# Patient Record
Sex: MALE | Race: BLACK OR AFRICAN AMERICAN | NOT HISPANIC OR LATINO | Employment: FULL TIME | URBAN - METROPOLITAN AREA
[De-identification: names, ages, dates, MRNs, and addresses within clinical notes are randomized per-mention and may not be internally consistent; named-entity substitution may affect disease eponyms.]

---

## 2018-06-20 ENCOUNTER — HOSPITAL ENCOUNTER (EMERGENCY)
Facility: HOSPITAL | Age: 23
Discharge: HOME | End: 2018-06-20
Attending: EMERGENCY MEDICINE

## 2018-06-20 VITALS
HEIGHT: 64 IN | BODY MASS INDEX: 22.2 KG/M2 | DIASTOLIC BLOOD PRESSURE: 64 MMHG | OXYGEN SATURATION: 99 % | WEIGHT: 130 LBS | RESPIRATION RATE: 20 BRPM | TEMPERATURE: 98 F | SYSTOLIC BLOOD PRESSURE: 122 MMHG | HEART RATE: 70 BPM

## 2018-06-20 DIAGNOSIS — R59.1 LYMPHADENOPATHY: Primary | ICD-10-CM

## 2018-06-20 DIAGNOSIS — N50.89 SCROTAL MASS: ICD-10-CM

## 2018-06-20 DIAGNOSIS — R19.09 GROIN MASS: ICD-10-CM

## 2018-06-20 PROCEDURE — 99284 EMERGENCY DEPT VISIT MOD MDM: CPT

## 2018-06-20 RX ORDER — CEPHALEXIN 500 MG/1
1000 CAPSULE ORAL EVERY 12 HOURS
Qty: 20 CAPSULE | Refills: 0 | Status: SHIPPED | OUTPATIENT
Start: 2018-06-20 | End: 2018-06-25

## 2018-06-20 NOTE — ED PROVIDER NOTES
Encounter Date: 6/20/2018    SORT: R inguinal pain x 2 days. No urinary complaints. No penile or testicular pain/swelling. No n/v. No fever. Admits to mass to inguinal area. No hx hernia rx.  SCRIBE #1 NOTE: I, Sasmon Jones, am scribing for, and in the presence of,  Dallas Zurita MD. I have scribed the following portions of the note - Other sections scribed: HPI and ROS.       History     Chief Complaint   Patient presents with    Groin Pain     right side     Chief Complaint: Groin Pain    HPI: This 22 y.o. Male wit no known PMHx presents to the ED c/o acute onset right side groin pain. Symptoms began yesterday. Patient suspects possible hernia. Pain is constant, severe, and has worsened since onset. Patient attempted treatment with Aspirin. He does endorse a lot of heavy lifting at work. He denies numbness, weakness, nausea, vomiting, or abdominal pain.      The history is provided by the patient. No  was used.     Review of patient's allergies indicates:   Allergen Reactions    Bananas [banana] Rash     No past medical history on file.  No past surgical history on file.  No family history on file.  Social History   Substance Use Topics    Smoking status: Not on file    Smokeless tobacco: Not on file    Alcohol use Not on file     Review of Systems   Constitutional: Negative for chills and fever.   HENT: Negative for ear pain and sore throat.    Eyes: Negative for pain.   Respiratory: Negative for cough and shortness of breath.    Cardiovascular: Negative for chest pain.   Gastrointestinal: Negative for abdominal pain, diarrhea, nausea and vomiting.   Genitourinary: Negative for dysuria.        Groin pain   Musculoskeletal: Negative for myalgias (arm or leg pain).   Skin: Negative for rash.   Neurological: Negative for weakness, numbness and headaches.       Physical Exam     Initial Vitals [06/20/18 1552]   BP Pulse Resp Temp SpO2   122/64 70 20 98.3 °F (36.8 °C) 99 %      MAP       --          Physical Exam    Nursing note and vitals reviewed.  Constitutional: He appears well-developed and well-nourished.   HENT:   Head: Normocephalic and atraumatic.   Eyes: EOM are normal.   Neck: Neck supple.   Cardiovascular: Normal rate.   Pulmonary/Chest: Effort normal and breath sounds normal. No respiratory distress.   Genitourinary:   Genitourinary Comments: Area of induration and tenderness to R groin in the area of tunica vaginalis.      Mass is moveable    Didn't feel anything go through canal when having patient cough   Musculoskeletal: Normal range of motion.   Neurological: He is alert and oriented to person, place, and time.   Skin: Skin is warm and dry.   Psychiatric: He has a normal mood and affect.         ED Course   Procedures  Labs Reviewed - No data to display       Imaging Results    None                     Scribe Attestation:   Scribe #1: I performed the above scribed service and the documentation accurately describes the services I performed. I attest to the accuracy of the note.    Attending Attestation:           Physician Attestation for Scribe:  Physician Attestation Statement for Scribe #1: I, Dallas Zurita MD, reviewed documentation, as scribed by Samson Jones in my presence, and it is both accurate and complete.                    Clinical Impression:   There were no encounter diagnoses.                             Dallas Zurita MD  11/13/18 1575

## 2022-06-01 ENCOUNTER — HOSPITAL ENCOUNTER (EMERGENCY)
Facility: HOSPITAL | Age: 27
Discharge: HOME | End: 2022-06-01
Attending: EMERGENCY MEDICINE

## 2022-06-01 VITALS
OXYGEN SATURATION: 99 % | RESPIRATION RATE: 16 BRPM | HEART RATE: 55 BPM | SYSTOLIC BLOOD PRESSURE: 109 MMHG | TEMPERATURE: 98 F | DIASTOLIC BLOOD PRESSURE: 75 MMHG | WEIGHT: 120 LBS | HEIGHT: 67 IN | BODY MASS INDEX: 18.83 KG/M2

## 2022-06-01 DIAGNOSIS — M54.50 ACUTE BILATERAL LOW BACK PAIN WITHOUT SCIATICA: Primary | ICD-10-CM

## 2022-06-01 PROCEDURE — 99284 EMERGENCY DEPT VISIT MOD MDM: CPT | Mod: 25

## 2022-06-01 PROCEDURE — 96372 THER/PROPH/DIAG INJ SC/IM: CPT | Performed by: NURSE PRACTITIONER

## 2022-06-01 PROCEDURE — 63600175 PHARM REV CODE 636 W HCPCS: Performed by: NURSE PRACTITIONER

## 2022-06-01 PROCEDURE — 25000003 PHARM REV CODE 250: Performed by: NURSE PRACTITIONER

## 2022-06-01 RX ORDER — KETOROLAC TROMETHAMINE 10 MG/1
10 TABLET, FILM COATED ORAL EVERY 6 HOURS PRN
Qty: 20 TABLET | Refills: 0 | Status: SHIPPED | OUTPATIENT
Start: 2022-06-01 | End: 2022-06-06

## 2022-06-01 RX ORDER — IBUPROFEN 200 MG
800 TABLET ORAL EVERY 6 HOURS PRN
COMMUNITY

## 2022-06-01 RX ORDER — ACETAMINOPHEN 500 MG
1000 TABLET ORAL
Status: COMPLETED | OUTPATIENT
Start: 2022-06-01 | End: 2022-06-01

## 2022-06-01 RX ORDER — LIDOCAINE 50 MG/G
1 PATCH TOPICAL DAILY
Qty: 15 PATCH | Refills: 0 | Status: SHIPPED | OUTPATIENT
Start: 2022-06-01

## 2022-06-01 RX ORDER — DEXAMETHASONE SODIUM PHOSPHATE 4 MG/ML
8 INJECTION, SOLUTION INTRA-ARTICULAR; INTRALESIONAL; INTRAMUSCULAR; INTRAVENOUS; SOFT TISSUE
Status: COMPLETED | OUTPATIENT
Start: 2022-06-01 | End: 2022-06-01

## 2022-06-01 RX ORDER — ORPHENADRINE CITRATE 100 MG/1
100 TABLET, EXTENDED RELEASE ORAL 2 TIMES DAILY PRN
Qty: 20 TABLET | Refills: 0 | Status: SHIPPED | OUTPATIENT
Start: 2022-06-01 | End: 2022-06-11

## 2022-06-01 RX ADMIN — DEXAMETHASONE SODIUM PHOSPHATE 8 MG: 4 INJECTION INTRA-ARTICULAR; INTRALESIONAL; INTRAMUSCULAR; INTRAVENOUS; SOFT TISSUE at 11:06

## 2022-06-01 RX ADMIN — ACETAMINOPHEN 1000 MG: 500 TABLET ORAL at 11:06

## 2022-06-01 NOTE — Clinical Note
"Mayco London (Shawn) was seen and treated in our emergency department on 6/1/2022.  He may return to work on 06/06/2022.       If you have any questions or concerns, please don't hesitate to call.       RN    "

## 2022-06-01 NOTE — DISCHARGE INSTRUCTIONS
You have been prescribed NORFLEX for pain. Please do not take this medication while working, drinking alcohol, swimming, or while driving/operating heavy machinery. This medication may cause drowsiness, impair judgment, and reduce physical capabilities.    You have been prescribed TORADOL for pain. This is an Non-Steroidal Anti-Inflammatory (NSAID) Medication. Please do not take any additional NSAIDs while you are taking this medication including (Advil, Aleve, Motrin, Ibuprofen, Mobic\meloxicam, Naprosyn, etc.). Please stop taking this medication if you experience: weakness, itching, yellow skin or eyes, joint pains, vomiting blood, blood or black stools, unusual weight gain, or swelling in your arms, legs, hands, or feet.     Please return to the Emergency Department for any new or worsening symptoms including:  Abdominal pain, numbness or tingling to your groin, difficulty walking, going to the bathroom on yourself, problems going to the bathroom, fever, chest pain, shortness of breath, loss of consciousness, dizziness, weakness, or any other concerns.     Please follow up with your Primary Care Provider within the week. If you do not have one, you may contact the one listed on your discharge paperwork or you may also call the [facility] Appointment Desk at [phone] to schedule an appointment with one.     Please take all medication as prescribed.

## 2022-06-01 NOTE — ED TRIAGE NOTES
"Patient reports pain to lower back area and describes it as "stabbing". Patient states that he has been in pain for the past week. Patient also reports n/v and hasn't been able to keep anything down for the past week but isn't experiencing G. I. Discomfort currently.   "

## 2022-06-01 NOTE — ED PROVIDER NOTES
Encounter Date: 6/1/2022       History     Chief Complaint   Patient presents with    Back Pain     The patient reports lower bilateral back pain that started over 2 weeks ago. Denies falls, trauma. Reports that he does heavy lifting at work. Denies pain radiating. Patient states that he took ibuprofen this morning with some pain relief.     27 y/o male with hypoglycemia presents to the ED c/o x1 wk hx of acute onset atraumatic lumbar back pain (L>R) that worsened x2 days ago. He reports an 8/10 pain that is worse with movement. He reports heavy lifting at work. He attempted 800 mg ibuprofen at 9AM today with mild relief. He denies fever, chills, saddle anesthesia, bladder/bowel incontinence, hematuria, dysuria, or abdominal pain. No hx of IVDU, cancer, or DMII. No other symptoms reported.    The history is provided by the patient. No  was used.     Review of patient's allergies indicates:   Allergen Reactions    Bananas [banana] Rash     History reviewed. No pertinent past medical history.  History reviewed. No pertinent surgical history.  History reviewed. No pertinent family history.  Social History     Tobacco Use    Smoking status: Current Every Day Smoker     Packs/day: 0.50     Types: Cigarettes    Smokeless tobacco: Never Used   Substance Use Topics    Alcohol use: No    Drug use: Yes     Types: Marijuana     Comment: daily     Review of Systems   Constitutional: Negative for fever.   HENT: Negative for sore throat.    Eyes: Negative for redness.   Respiratory: Negative for shortness of breath.    Cardiovascular: Negative for chest pain.   Gastrointestinal: Negative for nausea.   Genitourinary: Negative for dysuria.   Musculoskeletal: Positive for back pain (lumbar).   Skin: Negative for rash.   Neurological: Negative for weakness.   Hematological: Does not bruise/bleed easily.       Physical Exam     Initial Vitals [06/01/22 1005]   BP Pulse Resp Temp SpO2   116/71 (!) 58 16 98.5  °F (36.9 °C) 97 %      MAP       --         Physical Exam    Vitals reviewed.  Constitutional: He appears well-developed and well-nourished. He is not diaphoretic.  Non-toxic appearance. He does not have a sickly appearance. He does not appear ill. No distress.   HENT:   Head: Normocephalic and atraumatic.   Right Ear: External ear normal.   Left Ear: External ear normal.   Neck:   Normal range of motion.  Cardiovascular: Normal rate, regular rhythm, normal heart sounds and intact distal pulses.   Pulmonary/Chest: Breath sounds normal. No respiratory distress.   Abdominal: Abdomen is soft. Bowel sounds are normal. There is no abdominal tenderness.   Musculoskeletal:         General: Normal range of motion.      Cervical back: Normal and normal range of motion.      Thoracic back: Normal.      Lumbar back: Tenderness (b/l paraspinal) present. No swelling, edema, deformity, signs of trauma, spasms or bony tenderness.      Comments: No step offs. Sensation and strength intact for b/l LE.     Neurological: He is alert and oriented to person, place, and time. GCS eye subscore is 4. GCS verbal subscore is 5. GCS motor subscore is 6.   Skin: Skin is warm, dry and intact. No rash noted. No erythema.   Psychiatric: He has a normal mood and affect. Thought content normal.         ED Course   Procedures  Labs Reviewed - No data to display       Imaging Results    None          Medications   dexamethasone injection 8 mg (8 mg Intramuscular Given 6/1/22 1108)   acetaminophen tablet 1,000 mg (1,000 mg Oral Given 6/1/22 1108)     Medical Decision Making:   ED Management:    This is an evaluation of a 26 y.o. male that presents to the Emergency Department for back pain.  Denies fever, rash, night sweats, weight loss, dysuria, bowel/bladder incontinence, or IV\SQ drug use. The patient is a non-toxic, afebrile, and well appearing male. On physical exam, there is tenderness with palpation of the paraspinal lumbar musculature. Thereis  no abdominal pain to palpation, CVA tenderness, saddle anesthesia. Reflexes and sensation are symmetric bilaterally. DP pulses are symmetrical.     Vital signs reassuring.    No red flags on exam or history.    Given the above findings, I do not think the patient has acute vertebral fracture, subluxation, dislocation, epidural abscess, cauda equina, shingles, UTI.    The diagnosis, treatment plan, instructions for follow-up and reevaluation with PCP as well as ED return precautions were discussed and understanding was verbalized. All questions or concerns have been addressed.                         Clinical Impression:   Final diagnoses:  [M54.50] Acute bilateral low back pain without sciatica (Primary)          ED Disposition Condition    Discharge Stable        ED Prescriptions     Medication Sig Dispense Start Date End Date Auth. Provider    orphenadrine (NORFLEX) 100 mg tablet Take 1 tablet (100 mg total) by mouth 2 (two) times daily as needed for Muscle spasms or Pain. 20 tablet 6/1/2022 6/11/2022 Loraine Contreras NP    ketorolac (TORADOL) 10 mg tablet Take 1 tablet (10 mg total) by mouth every 6 (six) hours as needed for Pain. 20 tablet 6/1/2022 6/6/2022 Loraine Contreras NP    LIDOcaine (LIDODERM) 5 % Place 1 patch onto the skin once daily. Remove & Discard patch within 12 hours or as directed by MD 15 patch 6/1/2022  Loraine Contreras NP        Follow-up Information     Follow up With Specialties Details Why Contact Info Additional Information    [facility] [place]  Schedule an appointment as soon as possible for a visit in 1 day For follow-up if you do not have a primary care doctor [address] [place] [address]  [phone]       [facility] Schedule an appointment as soon as possible for a visit  For follow-up [address], [address]  [facility] [address] [phone] Back [facility] - [facility], 4th Floor Please park in [address] and use [place] elevators    [facility] Emergency Dept Emergency Medicine Go to  If symptoms worsen [address]  [facility] [address] [phone]            Loraine Contreras NP  06/01/22 1303

## 2024-12-02 ENCOUNTER — HOSPITAL ENCOUNTER (EMERGENCY)
Facility: HOSPITAL | Age: 29
Discharge: HOME | End: 2024-12-02
Attending: EMERGENCY MEDICINE
Payer: COMMERCIAL

## 2024-12-02 VITALS
WEIGHT: 130 LBS | OXYGEN SATURATION: 96 % | HEIGHT: 65 IN | SYSTOLIC BLOOD PRESSURE: 122 MMHG | DIASTOLIC BLOOD PRESSURE: 77 MMHG | RESPIRATION RATE: 16 BRPM | BODY MASS INDEX: 21.66 KG/M2 | HEART RATE: 86 BPM | TEMPERATURE: 99 F

## 2024-12-02 DIAGNOSIS — M79.644 THUMB PAIN, RIGHT: Primary | ICD-10-CM

## 2024-12-02 PROCEDURE — 99283 EMERGENCY DEPT VISIT LOW MDM: CPT | Mod: 25

## 2024-12-02 PROCEDURE — 29125 APPL SHORT ARM SPLINT STATIC: CPT | Mod: RT

## 2024-12-02 RX ORDER — NAPROXEN 500 MG/1
500 TABLET ORAL 2 TIMES DAILY WITH MEALS
Qty: 14 TABLET | Refills: 0 | Status: SHIPPED | OUTPATIENT
Start: 2024-12-02

## 2024-12-02 NOTE — ED PROVIDER NOTES
Encounter Date: 12/2/2024       History     Chief Complaint   Patient presents with    Hand Pain     Pt reports R thumb (thenar space) pain x 1 week after using a punching bag while exercising.      Chief complaint: Right thumb pain     History of present illness: Patient is a 29-year-old male who reports for 1 week he has had right thumb pain without known injury.  The pain is at the base of the thumb it is constant aching and shooting.  It is worse with bending.  There is no alleviating factors.  Current severity pain is 7/10.  He has used icy hot and wraps without relief.    The history is provided by the patient. No  was used.     Review of patient's allergies indicates:   Allergen Reactions    Bananas [banana] Rash     History reviewed. No pertinent past medical history.  History reviewed. No pertinent surgical history.  No family history on file.  Social History     Tobacco Use    Smoking status: Every Day     Current packs/day: 0.50     Types: Cigarettes    Smokeless tobacco: Never   Substance Use Topics    Alcohol use: No    Drug use: Yes     Types: Marijuana     Comment: daily     Review of Systems   Constitutional:  Negative for appetite change, chills, diaphoresis, fatigue and fever.   HENT:  Negative for congestion, ear discharge, ear pain, postnasal drip, rhinorrhea, sinus pressure, sneezing, sore throat and voice change.    Eyes:  Negative for discharge, itching and visual disturbance.   Respiratory:  Negative for cough, shortness of breath and wheezing.    Cardiovascular:  Negative for chest pain, palpitations and leg swelling.   Gastrointestinal:  Negative for abdominal pain, nausea and vomiting.   Endocrine: Negative for polydipsia, polyphagia and polyuria.   Genitourinary:  Negative for difficulty urinating, dysuria, frequency, hematuria, penile discharge, penile pain, penile swelling and urgency.   Musculoskeletal:  Positive for arthralgias. Negative for myalgias.   Skin:   Negative for rash and wound.   Neurological:  Negative for dizziness, seizures, syncope and weakness.   Hematological:  Negative for adenopathy. Does not bruise/bleed easily.   Psychiatric/Behavioral:  Negative for agitation and self-injury. The patient is not nervous/anxious.        Physical Exam     Initial Vitals [12/02/24 1047]   BP Pulse Resp Temp SpO2   122/77 86 16 98.6 °F (37 °C) 96 %      MAP       --         Physical Exam    Nursing note and vitals reviewed.  Constitutional: He appears well-developed and well-nourished. He is not diaphoretic. No distress.   HENT:   Head: Normocephalic and atraumatic.   Right Ear: External ear normal.   Left Ear: External ear normal.   Nose: Nose normal.   Eyes: Pupils are equal, round, and reactive to light. Right eye exhibits no discharge. Left eye exhibits no discharge. No scleral icterus.   Neck:   Normal range of motion.  Pulmonary/Chest: No respiratory distress.   Abdominal: He exhibits no distension.   Musculoskeletal:         General: Normal range of motion.      Cervical back: Normal range of motion.      Comments: Right thumb with full range of motion in all dimensions of movement against resistance.  Distal P SM is intact.  There is snuffbox tenderness.     Neurological: He is alert and oriented to person, place, and time.   Skin: Skin is dry. Capillary refill takes less than 2 seconds.         ED Course   Procedures  Labs Reviewed - No data to display       Imaging Results              X-Ray Hand 3 View Right (Final result)  Result time 12/02/24 12:28:04      Final result by Dell Benjamin MD (12/02/24 12:28:04)                   Impression:      Suspected mild nonspecific soft tissue swelling overlying the 3rd metacarpal head, without acute displaced fracture-dislocation identified.      Electronically signed by: Dell Benjamin MD  Date:    12/02/2024  Time:    12:28               Narrative:    EXAMINATION:  XR HAND COMPLETE 3 VIEW RIGHT    CLINICAL  "HISTORY:  trauma;    TECHNIQUE:  PA, lateral, and oblique views of the right hand were performed.    COMPARISON:  None    FINDINGS:  Bones are well mineralized. Overall alignment is within normal limits. No displaced fracture, dislocation or destructive osseous process.  Joint spaces appear relatively maintained.  Mild prominence of the soft tissues overlying the dorsal aspect of the 3rd metacarpal head may reflect nonspecific soft tissue swelling from edema or cellulitis.  No subcutaneous emphysema or radiopaque foreign body.                                       Medications - No data to display  Medical Decision Making  Patient is a 29-year-old male who reports for 1 week he has had right thumb pain without known injury.  The pain is at the base of the thumb it is constant aching and shooting.  It is worse with bending.  There is no alleviating factors.  Current severity pain is 7/10.  He has used icy hot and wraps without relief.    Right thumb with full range of motion in all dimensions of movement against resistance.  Distal P SM is intact.  There is snuffbox tenderness.     Differential diagnosis includes fracture dislocation sprain strain.    Problems Addressed:  Thumb pain, right: acute illness or injury     Details: Velcro thumb spica splint applied.  Patient discharged on Naprosyn to follow up with hand surgery.    Amount and/or Complexity of Data Reviewed  Discussion of management or test interpretation with external provider(s): Vital signs at the time of disposition were:  /77 (BP Location: Right arm)   Pulse 86   Temp 98.6 °F (37 °C) (Oral)   Resp 16   Ht 5' 5" (1.651 m)   Wt 59 kg (130 lb)   SpO2 96%   BMI 21.63 kg/m²       See AVS for additional recommendations. Medications listed herein were prescribed after reviewing the patient's allergies, medication list, history, most recent laboratories as available.  Referrals below were provided after reviewing the patient's previous medical " providers. He understands he  should return for any worsening or changes in condition.  Prior to discharge the patient was asked if he  had any additional concerns or complaints and he declined. The patient was given an opportunity to ask questions and all were answered to his satisfaction.     Risk  Prescription drug management.  Diagnosis or treatment significantly limited by social determinants of health.               ED Course as of 12/02/24 1343   Mon Dec 02, 2024   1118 BP: 122/77 [VC]   1118 Temp: 98.6 °F (37 °C) [VC]   1118 Temp Source: Oral [VC]   1118 Pulse: 86 [VC]   1118 SpO2: 96 % [VC]   1118 Resp: 16 []      ED Course User Index  [] Jorge Sharma DNP                           Clinical Impression:  Final diagnoses:  [M79.644] Thumb pain, right (Primary)          ED Disposition Condition    Discharge           ED Prescriptions       Medication Sig Dispense Start Date End Date Auth. Provider    naproxen (NAPROSYN) 500 MG tablet Take 1 tablet (500 mg total) by mouth 2 (two) times daily with meals. 14 tablet 12/2/2024 -- Jorge Sharma DNP          Follow-up Information       Follow up With Specialties Details Why Contact Info    Ugo Briseno Jr., MD Hand Surgery, Orthopedic Surgery Schedule an appointment as soon as possible for a visit   [address]  [address]  [facility] [id number] [phone]               Jorge Sharma DNP  12/02/24 2143

## 2024-12-02 NOTE — DISCHARGE INSTRUCTIONS
You have been prescribed naprosyn (naproxen sodium), an anti-inflammatory.  Take this medication whether you feel you need it or not.  Do not take ibuprofen, naproxen or other NSAID's medications while taking this medication. Return to the Emergency Department for any worsening, change in condition, or any emergent concerns.

## 2025-04-10 ENCOUNTER — HOSPITAL ENCOUNTER (EMERGENCY)
Facility: HOSPITAL | Age: 30
Discharge: HOME | End: 2025-04-10
Attending: STUDENT IN AN ORGANIZED HEALTH CARE EDUCATION/TRAINING PROGRAM
Payer: OTHER MISCELLANEOUS

## 2025-04-10 VITALS
SYSTOLIC BLOOD PRESSURE: 113 MMHG | RESPIRATION RATE: 18 BRPM | BODY MASS INDEX: 21.66 KG/M2 | HEIGHT: 65 IN | DIASTOLIC BLOOD PRESSURE: 68 MMHG | TEMPERATURE: 98 F | HEART RATE: 66 BPM | OXYGEN SATURATION: 94 % | WEIGHT: 130 LBS

## 2025-04-10 DIAGNOSIS — R55 SYNCOPE: ICD-10-CM

## 2025-04-10 DIAGNOSIS — E86.0 DEHYDRATION: Primary | ICD-10-CM

## 2025-04-10 DIAGNOSIS — B34.9 VIRAL SYNDROME: ICD-10-CM

## 2025-04-10 LAB
ABSOLUTE EOSINOPHIL (OHS): 0.02 K/UL
ABSOLUTE MONOCYTE (OHS): 1.5 K/UL (ref 0.3–1)
ABSOLUTE NEUTROPHIL COUNT (OHS): 12.69 K/UL (ref 1.8–7.7)
ALBUMIN SERPL BCP-MCNC: 4.2 G/DL (ref 3.5–5.2)
ALP SERPL-CCNC: 79 UNIT/L (ref 40–150)
ALT SERPL W/O P-5'-P-CCNC: 25 UNIT/L (ref 10–44)
ANION GAP (OHS): 11 MMOL/L (ref 8–16)
AST SERPL-CCNC: 46 UNIT/L (ref 11–45)
BASOPHILS # BLD AUTO: 0.07 K/UL
BASOPHILS NFR BLD AUTO: 0.4 %
BILIRUB SERPL-MCNC: 0.5 MG/DL (ref 0.1–1)
BUN SERPL-MCNC: 13 MG/DL (ref 6–20)
CALCIUM SERPL-MCNC: 9.3 MG/DL (ref 8.7–10.5)
CHLORIDE SERPL-SCNC: 102 MMOL/L (ref 95–110)
CO2 SERPL-SCNC: 23 MMOL/L (ref 23–29)
CREAT SERPL-MCNC: 0.9 MG/DL (ref 0.5–1.4)
CTP QC/QA: YES
CTP QC/QA: YES
ERYTHROCYTE [DISTWIDTH] IN BLOOD BY AUTOMATED COUNT: 13.2 % (ref 11.5–14.5)
GFR SERPLBLD CREATININE-BSD FMLA CKD-EPI: >60 ML/MIN/1.73/M2
GLUCOSE SERPL-MCNC: 106 MG/DL (ref 70–110)
GLUCOSE SERPL-MCNC: 94 MG/DL (ref 70–110)
HCT VFR BLD AUTO: 43.5 % (ref 40–54)
HGB BLD-MCNC: 14.9 GM/DL (ref 14–18)
IMM GRANULOCYTES # BLD AUTO: 0.06 K/UL (ref 0–0.04)
IMM GRANULOCYTES NFR BLD AUTO: 0.4 % (ref 0–0.5)
LYMPHOCYTES # BLD AUTO: 1.25 K/UL (ref 1–4.8)
MAGNESIUM SERPL-MCNC: 2.4 MG/DL (ref 1.6–2.6)
MCH RBC QN AUTO: 29.5 PG (ref 27–31)
MCHC RBC AUTO-ENTMCNC: 34.3 G/DL (ref 32–36)
MCV RBC AUTO: 86 FL (ref 82–98)
NUCLEATED RBC (/100WBC) (OHS): 0 /100 WBC
PLATELET # BLD AUTO: 188 K/UL (ref 150–450)
PMV BLD AUTO: 9.7 FL (ref 9.2–12.9)
POC MOLECULAR INFLUENZA A AGN: NEGATIVE
POC MOLECULAR INFLUENZA B AGN: NEGATIVE
POCT GLUCOSE: 106 MG/DL (ref 70–110)
POTASSIUM SERPL-SCNC: 3.7 MMOL/L (ref 3.5–5.1)
PROT SERPL-MCNC: 8.6 GM/DL (ref 6–8.4)
RBC # BLD AUTO: 5.05 M/UL (ref 4.6–6.2)
RELATIVE EOSINOPHIL (OHS): 0.1 %
RELATIVE LYMPHOCYTE (OHS): 8 % (ref 18–48)
RELATIVE MONOCYTE (OHS): 9.6 % (ref 4–15)
RELATIVE NEUTROPHIL (OHS): 81.5 % (ref 38–73)
SARS-COV-2 RDRP RESP QL NAA+PROBE: NEGATIVE
SODIUM SERPL-SCNC: 136 MMOL/L (ref 136–145)
WBC # BLD AUTO: 15.59 K/UL (ref 3.9–12.7)

## 2025-04-10 PROCEDURE — 93005 ELECTROCARDIOGRAM TRACING: CPT

## 2025-04-10 PROCEDURE — 63600175 PHARM REV CODE 636 W HCPCS: Mod: JZ,TB | Performed by: STUDENT IN AN ORGANIZED HEALTH CARE EDUCATION/TRAINING PROGRAM

## 2025-04-10 PROCEDURE — 96374 THER/PROPH/DIAG INJ IV PUSH: CPT

## 2025-04-10 PROCEDURE — 82962 GLUCOSE BLOOD TEST: CPT

## 2025-04-10 PROCEDURE — 96361 HYDRATE IV INFUSION ADD-ON: CPT

## 2025-04-10 PROCEDURE — 85025 COMPLETE CBC W/AUTO DIFF WBC: CPT | Performed by: STUDENT IN AN ORGANIZED HEALTH CARE EDUCATION/TRAINING PROGRAM

## 2025-04-10 PROCEDURE — 93010 ELECTROCARDIOGRAM REPORT: CPT | Mod: ,,, | Performed by: INTERNAL MEDICINE

## 2025-04-10 PROCEDURE — 83735 ASSAY OF MAGNESIUM: CPT | Performed by: STUDENT IN AN ORGANIZED HEALTH CARE EDUCATION/TRAINING PROGRAM

## 2025-04-10 PROCEDURE — 99284 EMERGENCY DEPT VISIT MOD MDM: CPT | Mod: 25

## 2025-04-10 PROCEDURE — 82040 ASSAY OF SERUM ALBUMIN: CPT | Performed by: STUDENT IN AN ORGANIZED HEALTH CARE EDUCATION/TRAINING PROGRAM

## 2025-04-10 PROCEDURE — 87502 INFLUENZA DNA AMP PROBE: CPT

## 2025-04-10 PROCEDURE — 25000003 PHARM REV CODE 250: Performed by: STUDENT IN AN ORGANIZED HEALTH CARE EDUCATION/TRAINING PROGRAM

## 2025-04-10 PROCEDURE — 87635 SARS-COV-2 COVID-19 AMP PRB: CPT | Performed by: STUDENT IN AN ORGANIZED HEALTH CARE EDUCATION/TRAINING PROGRAM

## 2025-04-10 RX ORDER — ACETAMINOPHEN 500 MG
1000 TABLET ORAL
Status: COMPLETED | OUTPATIENT
Start: 2025-04-10 | End: 2025-04-10

## 2025-04-10 RX ORDER — KETOROLAC TROMETHAMINE 30 MG/ML
15 INJECTION, SOLUTION INTRAMUSCULAR; INTRAVENOUS
Status: COMPLETED | OUTPATIENT
Start: 2025-04-10 | End: 2025-04-10

## 2025-04-10 RX ADMIN — ACETAMINOPHEN 1000 MG: 500 TABLET ORAL at 10:04

## 2025-04-10 RX ADMIN — SODIUM CHLORIDE, POTASSIUM CHLORIDE, SODIUM LACTATE AND CALCIUM CHLORIDE 1000 ML: 600; 310; 30; 20 INJECTION, SOLUTION INTRAVENOUS at 10:04

## 2025-04-10 RX ADMIN — KETOROLAC TROMETHAMINE 15 MG: 30 INJECTION, SOLUTION INTRAMUSCULAR; INTRAVENOUS at 10:04

## 2025-04-10 NOTE — ED NOTES
Ortho vitals completed, pt had no significant vital change in blood pressure, only change was Heart rate. Pt laying heart rate was 82 and had jumped to 115 when standing.

## 2025-04-10 NOTE — ED PROVIDER NOTES
Encounter Date: 4/10/2025       History     Chief Complaint   Patient presents with    Loss of Consciousness     Pt arrived via ems, pt chief complaint is Syncope. Pt had a syncopal episode while at work, pt states had been feeling unwell for past few days with flu like symptoms.       Patient is a 29-year-old with no past medical history presents after a syncopal episode at work.  Patient reports multiple days of congestion, sore throat, headaches.  He had 1 episode of subjective fever but has not had any measured fevers.  He denies nausea vomiting diarrhea.  At work today, cutting grass, he began to feel lightheaded.  Patient says he knew he was going to pass out and was able to slowly lower himself to the ground onto his elbow.  He denies head trauma.  Endorses continued throat pain and mild headache.  He has not taken any medications for his symptoms.  He denies chest pain, shortness of breath, current lightheadedness or dizziness, weakness.    The history is provided by the patient. No  was used.     Review of patient's allergies indicates:   Allergen Reactions    Bananas [banana] Rash     History reviewed. No pertinent past medical history.  History reviewed. No pertinent surgical history.  No family history on file.  Social History[1]  Review of Systems   Constitutional:  Negative for fever.   HENT:  Negative for sore throat.    Respiratory:  Negative for shortness of breath.    Cardiovascular:  Negative for chest pain.   Gastrointestinal:  Negative for nausea.   Genitourinary:  Negative for dysuria.   Musculoskeletal:  Negative for back pain.   Skin:  Negative for rash.   Neurological:  Negative for weakness.       Physical Exam     Initial Vitals [04/10/25 0926]   BP Pulse Resp Temp SpO2   98/60 80 18 98.4 °F (36.9 °C) 98 %      MAP       --         Physical Exam    Constitutional: Vital signs are normal. No distress.   HENT:   Head: Normocephalic and atraumatic.   Eyes: EOM are normal.    Neck:   Normal range of motion.  Cardiovascular:  Normal rate, regular rhythm and intact distal pulses.           Pulmonary/Chest: Breath sounds normal. No respiratory distress.   Abdominal: Abdomen is soft. There is no abdominal tenderness.   Musculoskeletal:         General: No edema. Normal range of motion.      Cervical back: Normal range of motion.     Neurological: He is alert and oriented to person, place, and time. GCS score is 15. GCS eye subscore is 4. GCS verbal subscore is 5. GCS motor subscore is 6.   Skin: Skin is warm and dry.         ED Course   Procedures  Labs Reviewed   COMPREHENSIVE METABOLIC PANEL - Abnormal       Result Value    Sodium 136      Potassium 3.7      Chloride 102      CO2 23      Glucose 94      BUN 13      Creatinine 0.9      Calcium 9.3      Protein Total 8.6 (*)     Albumin 4.2      Bilirubin Total 0.5      ALP 79      AST 46 (*)     ALT 25      Anion Gap 11      eGFR >60     CBC WITH DIFFERENTIAL - Abnormal    WBC 15.59 (*)     RBC 5.05      HGB 14.9      HCT 43.5      MCV 86      MCH 29.5      MCHC 34.3      RDW 13.2      Platelet Count 188      MPV 9.7      Nucleated RBC 0      Neut % 81.5 (*)     Lymph % 8.0 (*)     Mono % 9.6      Eos % 0.1      Basophil % 0.4      Imm Grans % 0.4      Neut # 12.69 (*)     Lymph # 1.25      Mono # 1.50 (*)     Eos # 0.02      Baso # 0.07      Imm Grans # 0.06 (*)    MAGNESIUM - Normal    Magnesium  2.4     CBC W/ AUTO DIFFERENTIAL    Narrative:     The following orders were created for panel order CBC auto differential.  Procedure                               Abnormality         Status                     ---------                               -----------         ------                     CBC with Differential[[id number]]          Abnormal            Final result                 Please view results for these tests on the individual orders.   SARS-COV-2 RDRP GENE    POC Rapid COVID Negative       Acceptable Yes     POCT  INFLUENZA A/B MOLECULAR    POC Molecular Influenza A Ag Negative      POC Molecular Influenza B Ag Negative       Acceptable Yes     POCT GLUCOSE MONITORING CONTINUOUS    POC Glucose 106            Imaging Results    None          Medications   lactated ringers bolus 1,000 mL (0 mLs Intravenous Stopped 4/10/25 1107)   ketorolac injection 15 mg (15 mg Intravenous Given 4/10/25 1044)   acetaminophen tablet 1,000 mg (1,000 mg Oral Given 4/10/25 1043)     Medical Decision Making  Mayco London is a 29 y.o. male with no PMH who presents to the ED with a syncopal episode    Initial vitals reassuring. Physical exam reveals mild nasal congestion.     Patient's presentation appears most consistent with vasovagal syncope versus orthostatic hypotension.  Ddx includes but is not limited to electrolyte abnormalities, MARIUM, anemia, dehydration. Given history, physical exam, vital signs and chart review, there is low concern for   Arrhythmia, aortic dissection, MI/unstable angina, PE, cardiogenic shock, CHF, CVA/TIA, intracranial lesion/mass, seizure, perforated viscous, ruptured AAA, medication reaction, intentional overdose.     I will obtain the following to better assess:  CBC, CMP, Mag, COVID/flu, orthostatic vital signs. Immediate interventions include LR, Toradol and Tylenol.     DISCLAIMER: This note was prepared with [employer] voice recognition transcription software. Garbled syntax, mangled pronouns, and other bizarre constructions may be attributed to that software system.      Amount and/or Complexity of Data Reviewed  Labs: ordered.    Risk  OTC drugs.  Prescription drug management.               ED Course as of 04/10/25 1732   Thu Apr 10, 2025   1000 Patient's orthostatic vitals notable for tachycardia with standing, patient is getting IV fluid hydration [KI]   1056 Patient's labs notable for mild leukocytosis with a left shift [KI]   1151 Patient reports resolution of symptoms with medications.  Workup  overall reassuring.  Patient will be discharged with instructions with supportive care.  All questions answered.  Return precautions given.  Patient is in agreement with the plan. [KI]      ED Course User Index  [KI] Lillian De La Paz MD                           Clinical Impression:  Final diagnoses:  [R55] Syncope  [E86.0] Dehydration (Primary)  [B34.9] Viral syndrome          ED Disposition Condition    Discharge Stable          ED Prescriptions    None       Follow-up Information    None            [1]   Social History  Tobacco Use    Smoking status: Every Day     Current packs/day: 0.50     Types: Cigarettes    Smokeless tobacco: Never   Substance Use Topics    Alcohol use: No    Drug use: Yes     Types: Marijuana     Comment: daily        Lillian De La Paz MD  04/10/25 4539

## 2025-04-10 NOTE — Clinical Note
"Mayco London (Shawn) was seen and treated in our emergency department on 4/10/2025.  He may return to work on 04/12/2025.       If you have any questions or concerns, please don't hesitate to call.      EVITA Hess LPN"

## 2025-04-13 LAB
OHS QRS DURATION: 88 MS
OHS QTC CALCULATION: 373 MS